# Patient Record
Sex: MALE | Race: WHITE
[De-identification: names, ages, dates, MRNs, and addresses within clinical notes are randomized per-mention and may not be internally consistent; named-entity substitution may affect disease eponyms.]

---

## 2018-07-04 ENCOUNTER — HOSPITAL ENCOUNTER (EMERGENCY)
Dept: HOSPITAL 91 - FTE | Age: 18
Discharge: HOME | End: 2018-07-04
Payer: SELF-PAY

## 2018-07-04 ENCOUNTER — HOSPITAL ENCOUNTER (EMERGENCY)
Age: 18
Discharge: HOME | End: 2018-07-04

## 2018-07-04 DIAGNOSIS — R07.89: Primary | ICD-10-CM

## 2018-07-04 PROCEDURE — 93005 ELECTROCARDIOGRAM TRACING: CPT

## 2018-07-04 PROCEDURE — 71045 X-RAY EXAM CHEST 1 VIEW: CPT

## 2018-07-04 PROCEDURE — 71100 X-RAY EXAM RIBS UNI 2 VIEWS: CPT

## 2018-07-04 PROCEDURE — 99284 EMERGENCY DEPT VISIT MOD MDM: CPT

## 2018-07-04 RX ADMIN — IBUPROFEN 1 MG: 600 TABLET ORAL at 03:29

## 2018-07-20 ENCOUNTER — HOSPITAL ENCOUNTER (EMERGENCY)
Dept: HOSPITAL 91 - FTE | Age: 18
Discharge: HOME | End: 2018-07-20
Payer: MEDICAID

## 2018-07-20 ENCOUNTER — HOSPITAL ENCOUNTER (EMERGENCY)
Age: 18
Discharge: HOME | End: 2018-07-20

## 2018-07-20 DIAGNOSIS — R21: Primary | ICD-10-CM

## 2018-07-20 PROCEDURE — 99284 EMERGENCY DEPT VISIT MOD MDM: CPT

## 2018-07-20 PROCEDURE — 96372 THER/PROPH/DIAG INJ SC/IM: CPT

## 2018-07-20 RX ADMIN — DIPHENHYDRAMINE HYDROCHLORIDE 1 MG: 25 CAPSULE ORAL at 20:42

## 2018-07-20 RX ADMIN — RANITIDINE 1 MG: 150 TABLET ORAL at 20:46

## 2018-07-20 RX ADMIN — DEXAMETHASONE SODIUM PHOSPHATE 1 MG: 10 INJECTION, SOLUTION INTRAMUSCULAR; INTRAVENOUS at 20:42

## 2018-07-21 ENCOUNTER — HOSPITAL ENCOUNTER (EMERGENCY)
Age: 18
Discharge: HOME | End: 2018-07-21

## 2018-07-21 ENCOUNTER — HOSPITAL ENCOUNTER (EMERGENCY)
Dept: HOSPITAL 91 - FTE | Age: 18
Discharge: HOME | End: 2018-07-21
Payer: MEDICAID

## 2018-07-21 DIAGNOSIS — R21: Primary | ICD-10-CM

## 2018-07-21 PROCEDURE — 99283 EMERGENCY DEPT VISIT LOW MDM: CPT

## 2019-03-28 ENCOUNTER — HOSPITAL ENCOUNTER (EMERGENCY)
Dept: HOSPITAL 91 - FTE | Age: 19
Discharge: HOME | End: 2019-03-28
Payer: SELF-PAY

## 2019-03-28 ENCOUNTER — HOSPITAL ENCOUNTER (EMERGENCY)
Dept: HOSPITAL 10 - FTE | Age: 19
Discharge: HOME | End: 2019-03-28
Payer: SELF-PAY

## 2019-03-28 VITALS
BODY MASS INDEX: 31.88 KG/M2 | WEIGHT: 186.73 LBS | HEIGHT: 64 IN | HEIGHT: 64 IN | BODY MASS INDEX: 31.88 KG/M2 | WEIGHT: 186.73 LBS

## 2019-03-28 VITALS — HEART RATE: 68 BPM | SYSTOLIC BLOOD PRESSURE: 118 MMHG | RESPIRATION RATE: 18 BRPM | DIASTOLIC BLOOD PRESSURE: 72 MMHG

## 2019-03-28 DIAGNOSIS — R07.89: ICD-10-CM

## 2019-03-28 DIAGNOSIS — M54.6: Primary | ICD-10-CM

## 2019-03-28 PROCEDURE — 71045 X-RAY EXAM CHEST 1 VIEW: CPT

## 2019-03-28 PROCEDURE — 99284 EMERGENCY DEPT VISIT MOD MDM: CPT

## 2019-03-28 PROCEDURE — 93005 ELECTROCARDIOGRAM TRACING: CPT

## 2019-03-28 PROCEDURE — 96372 THER/PROPH/DIAG INJ SC/IM: CPT

## 2019-03-28 RX ADMIN — KETOROLAC TROMETHAMINE 1 MG: 30 INJECTION, SOLUTION INTRAMUSCULAR at 13:26

## 2019-04-25 ENCOUNTER — HOSPITAL ENCOUNTER (EMERGENCY)
Dept: HOSPITAL 91 - FTE | Age: 19
Discharge: HOME | End: 2019-04-25
Payer: SELF-PAY

## 2019-04-25 ENCOUNTER — HOSPITAL ENCOUNTER (EMERGENCY)
Dept: HOSPITAL 10 - FTE | Age: 19
Discharge: HOME | End: 2019-04-25
Payer: SELF-PAY

## 2019-04-25 VITALS — SYSTOLIC BLOOD PRESSURE: 138 MMHG | RESPIRATION RATE: 20 BRPM | HEART RATE: 68 BPM | DIASTOLIC BLOOD PRESSURE: 65 MMHG

## 2019-04-25 VITALS
WEIGHT: 186.95 LBS | WEIGHT: 186.95 LBS | BODY MASS INDEX: 31.15 KG/M2 | HEIGHT: 65 IN | BODY MASS INDEX: 31.15 KG/M2 | HEIGHT: 65 IN

## 2019-04-25 DIAGNOSIS — M54.6: Primary | ICD-10-CM

## 2019-04-25 DIAGNOSIS — R10.13: ICD-10-CM

## 2019-04-25 PROCEDURE — 99284 EMERGENCY DEPT VISIT MOD MDM: CPT

## 2019-04-25 PROCEDURE — 96372 THER/PROPH/DIAG INJ SC/IM: CPT

## 2019-04-25 RX ADMIN — ALUMINUM HYDROXIDE, MAGNESIUM HYDROXIDE, DIMETHICONE 1 ML: 200; 200; 20 SUSPENSION ORAL at 20:01

## 2019-04-25 RX ADMIN — RANITIDINE 1 MG: 150 TABLET ORAL at 20:22

## 2019-04-25 RX ADMIN — KETOROLAC TROMETHAMINE 1 MG: 30 INJECTION, SOLUTION INTRAMUSCULAR at 20:01

## 2019-05-01 ENCOUNTER — HOSPITAL ENCOUNTER (EMERGENCY)
Dept: HOSPITAL 10 - FTE | Age: 19
LOS: 1 days | Discharge: HOME | End: 2019-05-02
Payer: SELF-PAY

## 2019-05-01 ENCOUNTER — HOSPITAL ENCOUNTER (EMERGENCY)
Dept: HOSPITAL 91 - FTE | Age: 19
LOS: 1 days | Discharge: HOME | End: 2019-05-02
Payer: SELF-PAY

## 2019-05-01 VITALS — WEIGHT: 182.98 LBS | HEIGHT: 60 IN | BODY MASS INDEX: 35.92 KG/M2

## 2019-05-01 DIAGNOSIS — K59.00: Primary | ICD-10-CM

## 2019-05-01 PROCEDURE — 99282 EMERGENCY DEPT VISIT SF MDM: CPT

## 2019-05-02 VITALS — SYSTOLIC BLOOD PRESSURE: 130 MMHG | RESPIRATION RATE: 18 BRPM | HEART RATE: 57 BPM | DIASTOLIC BLOOD PRESSURE: 85 MMHG

## 2019-10-29 ENCOUNTER — HOSPITAL ENCOUNTER (EMERGENCY)
Dept: HOSPITAL 10 - FTE | Age: 19
Discharge: HOME | End: 2019-10-29
Payer: MEDICAID

## 2019-10-29 VITALS
BODY MASS INDEX: 32.17 KG/M2 | WEIGHT: 200.18 LBS | HEIGHT: 66 IN | BODY MASS INDEX: 32.17 KG/M2 | HEIGHT: 66 IN | WEIGHT: 200.18 LBS

## 2019-10-29 VITALS — SYSTOLIC BLOOD PRESSURE: 143 MMHG | DIASTOLIC BLOOD PRESSURE: 70 MMHG | HEART RATE: 76 BPM | RESPIRATION RATE: 16 BRPM

## 2019-10-29 DIAGNOSIS — S49.91XA: ICD-10-CM
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
## 2019-10-29 DIAGNOSIS — S16.1XXA: Primary | ICD-10-CM

## 2019-10-29 DIAGNOSIS — S09.90XA: ICD-10-CM
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
## 2019-10-29 PROCEDURE — 73030 X-RAY EXAM OF SHOULDER: CPT

## 2019-10-29 PROCEDURE — 72040 X-RAY EXAM NECK SPINE 2-3 VW: CPT

## 2022-03-22 ENCOUNTER — HOSPITAL ENCOUNTER (EMERGENCY)
Dept: HOSPITAL 54 - ER | Age: 22
Discharge: HOME | End: 2022-03-22
Payer: SELF-PAY

## 2022-03-22 VITALS — HEIGHT: 66 IN | BODY MASS INDEX: 30.53 KG/M2 | WEIGHT: 190 LBS

## 2022-03-22 VITALS — SYSTOLIC BLOOD PRESSURE: 135 MMHG | DIASTOLIC BLOOD PRESSURE: 88 MMHG

## 2022-03-22 DIAGNOSIS — R00.2: ICD-10-CM

## 2022-03-22 DIAGNOSIS — M54.6: ICD-10-CM

## 2022-03-22 DIAGNOSIS — G89.29: Primary | ICD-10-CM

## 2022-03-22 LAB
BILIRUB UR QL STRIP: NEGATIVE
COLOR UR: YELLOW
GLUCOSE UR STRIP-MCNC: NEGATIVE MG/DL
LEUKOCYTE ESTERASE UR QL STRIP: NEGATIVE
NITRITE UR QL STRIP: NEGATIVE
PH UR STRIP: 6 [PH] (ref 5–8)
PROT UR QL STRIP: NEGATIVE MG/DL
UROBILINOGEN UR STRIP-MCNC: 0.2 EU/DL

## 2022-03-22 NOTE — NUR
BIBS C/O PALPITATIONS SINCE 12AM WITH PAIN IN MIDDLE OF BACK. PATIENT ALERT AND 
ORIENTED X3. AMBULATORY WITH NON LABORE DBREATHING PT IN BED 09 ON MONITOR AND 
POX.

## 2022-08-08 ENCOUNTER — HOSPITAL ENCOUNTER (EMERGENCY)
Dept: HOSPITAL 54 - ER | Age: 22
Discharge: HOME | End: 2022-08-08
Payer: SELF-PAY

## 2022-08-08 VITALS — SYSTOLIC BLOOD PRESSURE: 124 MMHG | DIASTOLIC BLOOD PRESSURE: 79 MMHG

## 2022-08-08 VITALS — BODY MASS INDEX: 35.44 KG/M2 | WEIGHT: 200 LBS | HEIGHT: 63 IN

## 2022-08-08 DIAGNOSIS — H66.91: Primary | ICD-10-CM
